# Patient Record
Sex: FEMALE
[De-identification: names, ages, dates, MRNs, and addresses within clinical notes are randomized per-mention and may not be internally consistent; named-entity substitution may affect disease eponyms.]

---

## 2018-06-26 ENCOUNTER — HOSPITAL ENCOUNTER (EMERGENCY)
Dept: HOSPITAL 14 - H.ER | Age: 33
Discharge: HOME | End: 2018-06-26
Payer: MEDICAID

## 2018-06-26 VITALS — OXYGEN SATURATION: 100 % | HEART RATE: 81 BPM

## 2018-06-26 VITALS — RESPIRATION RATE: 14 BRPM | SYSTOLIC BLOOD PRESSURE: 122 MMHG | TEMPERATURE: 98 F | DIASTOLIC BLOOD PRESSURE: 77 MMHG

## 2018-06-26 DIAGNOSIS — R19.7: Primary | ICD-10-CM

## 2018-06-26 DIAGNOSIS — F31.9: ICD-10-CM

## 2018-06-26 LAB
ALBUMIN SERPL-MCNC: 4.4 G/DL (ref 3.5–5)
ALBUMIN SERPL-MCNC: 4.8 G/DL (ref 3.5–5)
ALBUMIN/GLOB SERPL: 1.6 {RATIO} (ref 1–2.1)
ALBUMIN/GLOB SERPL: 1.7 {RATIO} (ref 1–2.1)
ALT SERPL-CCNC: 23 U/L (ref 9–52)
ALT SERPL-CCNC: 36 U/L (ref 9–52)
AST SERPL-CCNC: 29 U/L (ref 14–36)
AST SERPL-CCNC: 53 U/L (ref 14–36)
BASOPHILS # BLD AUTO: 0.1 K/UL (ref 0–0.2)
BASOPHILS NFR BLD: 1.1 % (ref 0–2)
BUN SERPL-MCNC: 8 MG/DL (ref 7–17)
BUN SERPL-MCNC: 8 MG/DL (ref 7–17)
CALCIUM SERPL-MCNC: 9.7 MG/DL (ref 8.4–10.2)
CALCIUM SERPL-MCNC: 9.7 MG/DL (ref 8.4–10.2)
EOSINOPHIL # BLD AUTO: 0.2 K/UL (ref 0–0.7)
EOSINOPHIL NFR BLD: 1.8 % (ref 0–4)
ERYTHROCYTE [DISTWIDTH] IN BLOOD BY AUTOMATED COUNT: 14.6 % (ref 11.5–14.5)
GFR NON-AFRICAN AMERICAN: > 60
GFR NON-AFRICAN AMERICAN: > 60
HGB BLD-MCNC: 14.3 G/DL (ref 12–16)
LIPASE SERPL-CCNC: 128 U/L (ref 23–300)
LIPASE SERPL-CCNC: 130 U/L (ref 23–300)
LYMPHOCYTES # BLD AUTO: 2.5 K/UL (ref 1–4.3)
LYMPHOCYTES NFR BLD AUTO: 25.3 % (ref 20–40)
MCH RBC QN AUTO: 29.2 PG (ref 27–31)
MCHC RBC AUTO-ENTMCNC: 32.9 G/DL (ref 33–37)
MCV RBC AUTO: 88.7 FL (ref 81–99)
MONOCYTES # BLD: 0.8 K/UL (ref 0–0.8)
MONOCYTES NFR BLD: 7.6 % (ref 0–10)
NEUTROPHILS # BLD: 6.4 K/UL (ref 1.8–7)
NEUTROPHILS NFR BLD AUTO: 64.2 % (ref 50–75)
NRBC BLD AUTO-RTO: 0.1 % (ref 0–0)
PLATELET # BLD: 306 K/UL (ref 130–400)
PMV BLD AUTO: 9.5 FL (ref 7.2–11.7)
RBC # BLD AUTO: 4.9 MIL/UL (ref 3.8–5.2)
WBC # BLD AUTO: 10 K/UL (ref 4.8–10.8)

## 2018-06-26 PROCEDURE — 83690 ASSAY OF LIPASE: CPT

## 2018-06-26 PROCEDURE — 81025 URINE PREGNANCY TEST: CPT

## 2018-06-26 PROCEDURE — 85025 COMPLETE CBC W/AUTO DIFF WBC: CPT

## 2018-06-26 PROCEDURE — 99283 EMERGENCY DEPT VISIT LOW MDM: CPT

## 2018-06-26 PROCEDURE — 80053 COMPREHEN METABOLIC PANEL: CPT

## 2018-06-26 NOTE — ED PDOC
HPI: Abdomen


Time Seen by Provider: 18 16:14


Chief Complaint (Nursing): Abdominal Pain


Chief Complaint (Provider): diarrhea, abd pain


History Per: Patient


Additional Complaint(s): 


32-year-old female with past medical history bipolar disorder presents with 

watery, nonbloody diarrhea ongoing for about 2 weeks on and off. Patient states 

when the diarrhea began she was also nauseous with vomiting but this has 

resolved. She denies fever or chills. No dysuria or vaginal bleeding. Patient 

denies consumption of any food that could've caused stomach upset 2 weeks ago. 

She is tolerating liquids and solids but has watery bowel movements after she 

eats or drinks almost immediately.





PMD: none





Past Medical History


Reviewed: Historical Data, Nursing Documentation, Vital Signs


Vital Signs: 


 Last Vital Signs











Temp  98.4 F   18 16:09


 


Pulse  81   18 16:09


 


Resp  20   18 16:09


 


BP  136/91 H  18 16:09


 


Pulse Ox  100   18 16:40














- Medical History


PMH: Bipolar Disorder





- Surgical History


Surgical History: Cholecystectomy





- Family History


Family History: States: No Known Family Hx





- Living Arrangements


Living Arrangements: With Family





- Social History


Current smoker - smoking cessation education provided: Yes


Alcohol: Social


Drugs: Denies





- Allergies


Allergies/Adverse Reactions: 


 Allergies











Allergy/AdvReac Type Severity Reaction Status Date / Time


 


No Known Allergies Allergy   Verified 18 16:09














Review of Systems


ROS Statement: Except As Marked, All Systems Reviewed And Found Negative


Constitutional: Negative for: Fever, Chills


Cardiovascular: Negative for: Chest Pain


Respiratory: Negative for: Cough


Gastrointestinal: Positive for: Abdominal Pain, Diarrhea (watery, nonbloody).  

Negative for: Nausea, Vomiting, Constipation, Melena, Hematochezia, Hematemesis

, Rectal Pain


Genitourinary Female: Negative for: Dysuria, Vaginal Discharge, Vaginal Bleeding





Physical Exam





- Reviewed


Nursing Documentation Reviewed: Yes


Vital Signs Reviewed: Yes





- Physical Exam


Appears: Positive for: Well, Non-toxic, No Acute Distress


Skin: Positive for: Normal Color.  Negative for: Rash


Eye Exam: Positive for: Normal appearance


Cardiovascular/Chest: Positive for: Regular Rate, Rhythm


Respiratory: Positive for: Normal Breath Sounds


Gastrointestinal/Abdominal: Positive for: Soft.  Negative for: Tenderness, 

Distended, Guarding, Rebound


Back: Negative for: L CVA Tenderness, R CVA Tenderness


Extremity: Positive for: Normal ROM


Neurologic/Psych: Positive for: Alert, Oriented





- Laboratory Results


Result Diagrams: 


 18 17:15





 18 18:25


Urine Pregnancy POC: Negative


Urine dip results: Negative for: Leukocyte Esterase, Blood, Nitrate, Ketones, 

Glucose, Bilirubin, Protein





- ECG


O2 Sat by Pulse Oximetry: 100


Pulse Ox Interpretation: Normal





Medical Decision Making


Medical Decision Makin-year-old female with diarrhea.  Abdominal exam is benign.





Plan:


CBC


CMP


Lipase


IVF


Urine dip


Urine pregnancy test


PO tylenol





CMP was hemolyzed, repeat was obtained and all values are within normal limits.





Patient is aware of all diagnostic testing results, all questions answered. 

Dietary instructions given for relief of diarrhea. Patient was referred to 

clinic for follow up.





Disposition





- Clinical Impression


Clinical Impression: 


 Diarrhea








- Patient ED Disposition


Is Patient to be Admitted: No


Counseled Patient/Family Regarding: Studies Performed, Diagnosis, Need For 

Followup, Rx Given





- Disposition


Referrals: 


[facility] [Outside]


Disposition: Routine/Home


Disposition Time: 19:01


Condition: STABLE


Additional Instructions: 


FOLLOW B.R.A.T. DIET FOR RELIEF OF DIARRHEA (BANANAS, RICE, APPPLES AND TOAST).

  DRINK PLENTY OF CLEAR LIQUIDS.  FOLLOW UP WITH CLINIC IN 2-3 DAYS. 


Instructions:  Diarrhea in Adolescents and Adults, [place] Diet


Forms:  [facility] (English)





Results





- Lab Results


Lab Results: 

















  18





  18:25 17:15 17:15


 


WBC    10.0


 


RBC    4.90


 


Hgb    14.3


 


Hct    43.5


 


MCV    88.7


 


MCH    29.2


 


MCHC    32.9 L


 


RDW    14.6 H


 


Plt Count    306


 


MPV    9.5


 


Neut % (Auto)    64.2


 


Lymph % (Auto)    25.3


 


Mono % (Auto)    7.6


 


Eos % (Auto)    1.8


 


Baso % (Auto)    1.1


 


Neut # (Auto)    6.4


 


Lymph # (Auto)    2.5


 


Mono # (Auto)    0.8


 


Eos # (Auto)    0.2


 


Baso # (Auto)    0.1


 


Sodium  141  140 


 


Potassium  3.9  5.3 H 


 


Chloride  104  105 


 


Carbon Dioxide  25  23 


 


Anion Gap  16  17 


 


BUN  8  8 


 


Creatinine  0.6 L  0.6 L 


 


Est GFR ( Amer)  > 60  > 60 


 


Est GFR (Non-Af Amer)  > 60  > 60 


 


Random Glucose  93  87 


 


Calcium  9.7  9.7 


 


Total Bilirubin  0.9  1.9 H 


 


AST  29  53 H 


 


ALT  36  23 


 


Alkaline Phosphatase  97  88 


 


Total Protein  7.1  7.9 


 


Albumin  4.4  4.8 


 


Globulin  2.7  3.1 


 


Albumin/Globulin Ratio  1.7  1.6 


 


Lipase  130  128

## 2018-12-08 ENCOUNTER — HOSPITAL ENCOUNTER (EMERGENCY)
Dept: HOSPITAL 31 - C.ER | Age: 33
Discharge: HOME | End: 2018-12-08
Payer: SELF-PAY

## 2018-12-08 VITALS — DIASTOLIC BLOOD PRESSURE: 92 MMHG | HEART RATE: 99 BPM | SYSTOLIC BLOOD PRESSURE: 132 MMHG

## 2018-12-08 VITALS — RESPIRATION RATE: 18 BRPM | TEMPERATURE: 98.2 F

## 2018-12-08 VITALS — OXYGEN SATURATION: 99 %

## 2018-12-08 DIAGNOSIS — J32.9: Primary | ICD-10-CM

## 2021-07-14 NOTE — C.PDOC
History Of Present Illness


33 year old female presents to the emergency department with complaints of a 

cough, body aches, runny nose, left ear pain associated with facial pain. She 

denies fever, vomiting, diarrhea, chest pain, shortness of breath, and rash. 


Time Seen by Provider: 12/08/18 16:38


Chief Complaint (Nursing): Cough, Cold, Congestion


History Per: Patient


History/Exam Limitations: no limitations


Onset/Duration Of Symptoms: Other (one week)


Current Symptoms Are (Timing): Still Present


Location Of Pain: Sinus/es, Diffuse Myalgias


Associated Symptoms: Cough, Sinus Drainage.  denies: Fever, Chills, Vomiting, 

Diarrhea





Past Medical History


Reviewed: Historical Data, Nursing Documentation, Vital Signs


Vital Signs: 





                                Last Vital Signs











Temp  98.2 F   12/08/18 16:17


 


Pulse  98 H  12/08/18 16:17


 


Resp  18   12/08/18 16:17


 


BP  126/91 H  12/08/18 16:17


 


Pulse Ox  99   12/08/18 16:17














- Medical History


PMH: Bipolar Disorder


Surgical History: Cholecystectomy


Family History: States: No Known Family Hx





- Social History


Hx Alcohol Use: Yes


Hx Substance Use: No





- Immunization History


Hx Tetanus Toxoid Vaccination: No


Hx Influenza Vaccination: No


Hx Pneumococcal Vaccination: No





Review Of Systems


Except As Marked, All Systems Reviewed And Found Negative.


Constitutional: Positive for: Malaise.  Negative for: Fever


ENT: Positive for: Ear Pain, Nose Discharge


Cardiovascular: Negative for: Chest Pain


Respiratory: Positive for: Cough.  Negative for: Shortness of Breath


Gastrointestinal: Negative for: Vomiting, Diarrhea


Skin: Negative for: Rash





Physical Exam





- Physical Exam


Appears: Well, Non-toxic, No Acute Distress


Skin: Normal Color, Warm, Dry, No Rash


Head: Atraumatic, Normacephalic


Eye(s): bilateral: Normal Inspection, PERRL, EOMI


Ear(s): Bilateral: Normal


Nose: Tenderness (sinus tenderness)


Oral Mucosa: Moist


Tongue: Normal Appearing, No Swelling


Lips: Normal Appearing, No Swelling


Throat: No Erythema, No Exudate


Neck: Normal ROM, Supple


Chest: Symmetrical, No Tenderness


Cardiovascular: Rhythm Regular, No Murmur


Respiratory: Normal Breath Sounds, No Rales, No Rhonchi, No Wheezing


Gastrointestinal/Abdominal: Soft, No Tenderness, No Guarding, No Rebound


Extremity: Normal ROM, No Swelling


Neurological/Psych: Oriented x3, Normal Speech, Normal Cognition


Gait: Steady





ED Course And Treatment


O2 Sat by Pulse Oximetry: 99 (RA)


Pulse Ox Interpretation: Normal


Progress Note: Plan:  Augmentin 1 tab PO.  Prednisone 40mg PO





Disposition





- Disposition


Referrals: 


[facility] [Outside]


Disposition: HOME/ ROUTINE


Disposition Time: 17:49


Condition: STABLE


Additional Instructions: 


Follow up with the medical doctor within 1-2 days. Return if worsened. 


Prescriptions: 


Amoxicillin/Clavulanate [Augmentin 875 MG-125 MG] 1 tab PO BID #14 tab


Ibuprofen [Motrin] 1 tab PO TID PRN #30 tab


 PRN Reason: Pain


predniSONE [Prednisone] 20 mg PO BID #10 tab


Instructions:  Sinusitis, Adult (DC)


Forms:  [facility] (English)





- Clinical Impression


Clinical Impression: 


 Sinusitis








- PA / NP / Resident Statement


MD/DO has reviewed & agrees with the documentation as recorded.





- Scribe Statement


The provider has reviewed the documentation as recorded by the Scribe (Gildardo Weiss)


All medical record entries made by the Scribe were at my direction and 

personally dictated by me. I have reviewed the chart and agree that the record 

accurately reflects my personal performance of the history, physical exam, 

medical decision making, and the department course for this patient. I have also

 personally directed, reviewed, and agree with the discharge instructions and 

disposition.
[[email or web address]] : Additional time spent outside of H&P in I reviewing information including but not limited to previous notes and laboratories for this patient